# Patient Record
Sex: FEMALE | Race: WHITE | NOT HISPANIC OR LATINO | Employment: OTHER | URBAN - METROPOLITAN AREA
[De-identification: names, ages, dates, MRNs, and addresses within clinical notes are randomized per-mention and may not be internally consistent; named-entity substitution may affect disease eponyms.]

---

## 2021-12-20 ENCOUNTER — TELEPHONE (OUTPATIENT)
Dept: OPHTHALMOLOGY | Facility: CLINIC | Age: 81
End: 2021-12-20
Payer: COMMERCIAL

## 2021-12-20 NOTE — TELEPHONE ENCOUNTER
I spoke to the patient who notes gradually worsening vision.  She notes that she was disturbed when she went to pass her drivers test and wasn't able to see anything.  She has been dealing with other medical problems so she hasn't paid attention to her vision.  I scheduled her with Dr. Foster since she has seen him before.     Negative FIT test for colon cancer. Can you let pt know?

## 2022-01-10 ENCOUNTER — OFFICE VISIT (OUTPATIENT)
Dept: OPHTHALMOLOGY | Facility: CLINIC | Age: 82
End: 2022-01-10
Attending: OPHTHALMOLOGY
Payer: COMMERCIAL

## 2022-01-10 DIAGNOSIS — H53.40 VISUAL FIELD DEFECT: ICD-10-CM

## 2022-01-10 DIAGNOSIS — H47.20 OPTIC ATROPHY: Primary | ICD-10-CM

## 2022-01-10 DIAGNOSIS — H53.40 VISUAL FIELD DEFECT: Primary | ICD-10-CM

## 2022-01-10 PROCEDURE — 92083 EXTENDED VISUAL FIELD XM: CPT | Performed by: OPHTHALMOLOGY

## 2022-01-10 PROCEDURE — 92004 COMPRE OPH EXAM NEW PT 1/>: CPT | Mod: GC | Performed by: OPHTHALMOLOGY

## 2022-01-10 PROCEDURE — 92133 CPTRZD OPH DX IMG PST SGM ON: CPT | Performed by: OPHTHALMOLOGY

## 2022-01-10 PROCEDURE — G0463 HOSPITAL OUTPT CLINIC VISIT: HCPCS | Performed by: TECHNICIAN/TECHNOLOGIST

## 2022-01-10 ASSESSMENT — VISUAL ACUITY
OS_SC: 20/250
OS_SC+: ECCE
METHOD: SNELLEN - LINEAR
OD_SC+: -2
OD_SC: 20/50

## 2022-01-10 ASSESSMENT — CONF VISUAL FIELD
METHOD: COUNTING FINGERS
OS_INFERIOR_TEMPORAL_RESTRICTION: 2
OS_SUPERIOR_TEMPORAL_RESTRICTION: 2
OD_NORMAL: 1
OS_INFERIOR_NASAL_RESTRICTION: 1
OS_SUPERIOR_NASAL_RESTRICTION: 2

## 2022-01-10 ASSESSMENT — SLIT LAMP EXAM - LIDS
COMMENTS: NORMAL
COMMENTS: NORMAL

## 2022-01-10 ASSESSMENT — REFRACTION_MANIFEST
OD_SPHERE: PLANO
OS_ADD: +2.75
OD_AXIS: 080
OS_SPHERE: BALANCE
OD_CYLINDER: +0.50
OD_ADD: +2.75

## 2022-01-10 ASSESSMENT — TONOMETRY
OS_IOP_MMHG: 16
OD_IOP_MMHG: 18
IOP_METHOD: ICARE

## 2022-01-10 ASSESSMENT — EXTERNAL EXAM - LEFT EYE: OS_EXAM: NORMAL

## 2022-01-10 ASSESSMENT — CUP TO DISC RATIO
OS_RATIO: 0.5
OD_RATIO: 0.5

## 2022-01-10 ASSESSMENT — EXTERNAL EXAM - RIGHT EYE: OD_EXAM: NORMAL

## 2022-01-10 NOTE — PROGRESS NOTES
"     Assessment & Plan     Sherita Reyes is a 81 year old female with the following diagnoses:   1. 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  ACE, CLIFFORD, folate, Artur's, B12, NMO, treponema, Lyme unremarkable.  MRI brain previously was reported as unremarkable. She was also evaluated at [facility] ophthalmology without any new findings.     She had an ocular migraine a few years ago but that has gone away. She went to have 's license renewed and did not pass in December 2021 she could not see the small line with either eye.  She is taking Preservision since 2008 but is not following anyone for macular degeneration. Difficulty reading up close at night and driving at night. Left eye she always sees a \"blob\". Right eye reports she sees better with higher contrast and things are not as sharp. Denies metamorphopsia.    Since her last visit she had renal failure 1/2013 but did not and is not requiring dialysis. Cholecystectomy Fall of 2015. Shingles left torso and back (not on the face) 2015. And history of stage 3 metastatic melanoma 2016 s/p resection and adjuvant oral chemotherapy and lymph node resection. One year she had dental work for several teeth cracking.    Patient has been going through a lot with 's recent illness and is under a lot of stress.     Independent historians:  Patient    Review of outside testing:  MRI brain 6/2/2011    IMPRESSION:   1. No radiographic evidence of acute intracranial abnormalities.   2. Mild atrophy.   3. Mild supratentorial white matter changes are nonspecific, but likely due to chronic small vessel ischemic change in a patient of this age.      MRA head and neck 6/2/2011  IMPRESSION:  Unremarkable MRA of the neck, as far as visualized.   IMPRESSION:  Grossly unremarkable MRA of the head.         My interpretation performed today of outside testing:    Review of outside clinical notes:  None    Past " medical history:  Stage 3 melanoma  Dysphagia  Hiatal hernia s/p repair        Medications:   Aspirin 81  Diphenhydramine daily   Loperamide prn  Furosemide   Losartan  Preservision AREDS       Family history / social history:  Father AMD      Exam:  Visual acuity 20/50-2 right eye 20/250 eccentric left eye.  Color vision 5/11 right eye and 0/11 left eye.  Pupils   Intraocular pressure 18 right eye and 16 left eye.  Anterior segment exam posterior capsular opacification left eye.  Fundus exam notable for large soft drusen in both eyes greater in left than right eye.      Tests ordered and interpreted today:  OCT RNFL right eye 60 mean thickness (78 2011), left eye 48  mean thickness (was 59 in 2011).    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  Lab workup in 2011 including ACE, CLIFFORD, folate, Artur's, B12, NMO, treponema, Lyme unremarkable.  MRI also previously unremarkable.  The patient also went to [facility] and they were unable to find a cause.  Patient does not meet criteria for driving without restrictions from a vision standpoint in the [facility].  An updated glasses prescription will get the patient seeing enough to meet requirements but still does not have enough peripheral vision.  Recommend patient get updated glasses prescription and return for tech visit with repeat visual field.      Discussed the importance of re-establishing care in the retina clinic for AMD follow up, she used to see Dr. Vasquez but has not been evaluated in retina for many years.           Attending Physician Attestation:  Complete documentation of historical and exam elements from today's encounter can be found in the full encounter summary report (not reduplicated in this progress  note).  I personally obtained the chief complaint(s) and history of present illness.  I confirmed and edited as necessary the review of systems, past medical/surgical history, family history, social history, and examination findings as documented by others; and I examined the patient myself.  I personally reviewed the relevant tests, images, and reports as documented above.  I formulated and edited as necessary the assessment and plan and discussed the findings and management plan with the patient and family. I personally reviewed the ophthalmic test(s) associated with this encounter, agree with the interpretation(s) as documented by the resident/fellow, and have edited the corresponding report(s) as necessary.  - Prieto Kruger MD  Ophthalmology Resident, PGY-3  [facility]

## 2022-02-03 ENCOUNTER — ALLIED HEALTH/NURSE VISIT (OUTPATIENT)
Dept: OPHTHALMOLOGY | Facility: CLINIC | Age: 82
End: 2022-02-03
Attending: OPHTHALMOLOGY
Payer: COMMERCIAL

## 2022-02-03 DIAGNOSIS — H53.40 VISUAL FIELD DEFECT: Primary | ICD-10-CM

## 2022-02-03 PROBLEM — D50.9 IRON DEFICIENCY ANEMIA: Status: ACTIVE | Noted: 2017-03-24

## 2022-02-03 PROBLEM — Z86.0100 HISTORY OF COLONIC POLYPS: Status: ACTIVE | Noted: 2018-09-05

## 2022-02-03 PROBLEM — K63.5 POLYP OF CECUM: Status: ACTIVE | Noted: 2018-04-17

## 2022-02-03 PROBLEM — K22.2 OBSTRUCTION OF ESOPHAGUS: Status: ACTIVE | Noted: 2018-11-19

## 2022-02-03 PROBLEM — R68.81 EARLY SATIETY: Status: ACTIVE | Noted: 2022-02-03

## 2022-02-03 PROBLEM — D12.1 BENIGN NEOPLASM OF APPENDIX: Status: ACTIVE | Noted: 2017-03-24

## 2022-02-03 PROBLEM — K44.9 DIAPHRAGMATIC HERNIA: Status: ACTIVE | Noted: 2018-11-19

## 2022-02-03 PROBLEM — K64.9 HEMORRHOIDS: Status: ACTIVE | Noted: 2017-03-22

## 2022-02-03 PROCEDURE — 92081 LIMITED VISUAL FIELD XM: CPT | Mod: 26 | Performed by: OPHTHALMOLOGY

## 2022-02-03 PROCEDURE — 92081 LIMITED VISUAL FIELD XM: CPT

## 2022-02-03 PROCEDURE — G0463 HOSPITAL OUTPT CLINIC VISIT: HCPCS

## 2022-02-03 ASSESSMENT — VISUAL ACUITY
OD_SC: 20/60
METHOD: SNELLEN - LINEAR
OD_CC+: -1+1
CORRECTION_TYPE: GLASSES
OD_CC: 20/40
OS_SC: 20/600
OS_CC: 20/600

## 2022-02-03 ASSESSMENT — TONOMETRY
OD_IOP_MMHG: 18
OS_IOP_MMHG: 16
IOP_METHOD: ICARE

## 2022-02-03 ASSESSMENT — CONF VISUAL FIELD
OS_SUPERIOR_TEMPORAL_RESTRICTION: 2
OS_SUPERIOR_NASAL_RESTRICTION: 2
OS_INFERIOR_TEMPORAL_RESTRICTION: 2
OS_INFERIOR_NASAL_RESTRICTION: 1

## 2022-02-03 ASSESSMENT — REFRACTION_WEARINGRX
OD_SPHERE: PLANO
OD_AXIS: 075
OS_ADD: +2.75
OD_CYLINDER: +0.50
OD_ADD: +2.75
OS_SPHERE: -0.50
OS_AXIS: 100
OS_CYLINDER: +1.00

## 2022-02-03 NOTE — NURSING NOTE
Chief Complaints and History of Present Illnesses   Patient presents with     Follow Up     Chief Complaint(s) and History of Present Illness(es)     Follow Up     Laterality: both eyes    Quality: States the new MRx is okay    Pain scale: 0/10              Comments     Here for repeat of DVF  Leona NAILS 10:21 AM February 3, 2022

## 2022-02-03 NOTE — PROGRESS NOTES
Assessment & Plan     Sherita Reyes is a 81 year old female with the following diagnoses:   1. Visual field defect         The patient came for a visual field only.  Please see separate interpretation.           Attending Physician Attestation:  I have seen and examined this patient.  I have confirmed and edited as necessary the chief complaint(s), history of present illness, review of systems, relevant history, and examination findings as documented by others.  I have personally reviewed the relevant tests, images, and reports as documented above.  I have confirmed and edited as necessary the assessment and plan and agree with this note.  - Prieto Foster MD 12:49 PM 2/3/2022

## 2022-02-07 ENCOUNTER — TELEPHONE (OUTPATIENT)
Dept: OPHTHALMOLOGY | Facility: CLINIC | Age: 82
End: 2022-02-07
Payer: COMMERCIAL

## 2022-02-07 NOTE — TELEPHONE ENCOUNTER
Spoke to patient.  With updated glasses she sees better and did much better on the visual field last visit.  This gives her enough peripheral vision to visual acuity to drive in the [facility] from a vision standpoint.  Form filled out and faxed.  Mailed copy to patient's address.      Ethel Osborne on 2/7/2022 at 10:05 AM

## 2022-02-16 ENCOUNTER — TELEPHONE (OUTPATIENT)
Dept: OPHTHALMOLOGY | Facility: CLINIC | Age: 82
End: 2022-02-16
Payer: COMMERCIAL

## 2022-02-16 NOTE — TELEPHONE ENCOUNTER
[facility] Call Center    Phone Message    May a detailed message be left on voicemail: yes     Reason for Call: Other: pt said needs to talk to Jaqueline about DMV Forms that need to be filled out something missing on last form, please call pt back to discuss    Thank you,    Action Taken: Message routed to:  [facility] ([facility] eye    Travel Screening: Not Applicable

## 2022-02-17 NOTE — TELEPHONE ENCOUNTER
Spoke to patient.  She received a letter saying the provider needs to provide more information.  She will fax me the letter and I will give her a call once I review.      Ethel Osborne on 2/17/2022 at 10:41 AM

## 2022-02-22 DIAGNOSIS — H35.30 AMD (AGE-RELATED MACULAR DEGENERATION), BILATERAL: Primary | ICD-10-CM

## 2022-02-23 ENCOUNTER — OFFICE VISIT (OUTPATIENT)
Dept: OPHTHALMOLOGY | Facility: CLINIC | Age: 82
End: 2022-02-23
Attending: OPHTHALMOLOGY
Payer: COMMERCIAL

## 2022-02-23 DIAGNOSIS — H35.30 AMD (AGE-RELATED MACULAR DEGENERATION), BILATERAL: ICD-10-CM

## 2022-02-23 DIAGNOSIS — H26.492 PCO (POSTERIOR CAPSULAR OPACIFICATION), LEFT: Primary | ICD-10-CM

## 2022-02-23 DIAGNOSIS — Z96.1 PSEUDOPHAKIA OF BOTH EYES: ICD-10-CM

## 2022-02-23 DIAGNOSIS — H35.713: ICD-10-CM

## 2022-02-23 DIAGNOSIS — H47.20 OPTIC ATROPHY: ICD-10-CM

## 2022-02-23 PROCEDURE — 92014 COMPRE OPH EXAM EST PT 1/>: CPT | Mod: 57 | Performed by: OPHTHALMOLOGY

## 2022-02-23 PROCEDURE — 92134 CPTRZ OPH DX IMG PST SGM RTA: CPT | Performed by: OPHTHALMOLOGY

## 2022-02-23 PROCEDURE — 250N000009 HC RX 250: Performed by: OPHTHALMOLOGY

## 2022-02-23 PROCEDURE — 66821 AFTER CATARACT LASER SURGERY: CPT | Mod: LT | Performed by: OPHTHALMOLOGY

## 2022-02-23 PROCEDURE — G0463 HOSPITAL OUTPT CLINIC VISIT: HCPCS

## 2022-02-23 PROCEDURE — 92250 FUNDUS PHOTOGRAPHY W/I&R: CPT | Performed by: OPHTHALMOLOGY

## 2022-02-23 RX ORDER — DIPHENHYDRAMINE HCL 25 MG
25 TABLET ORAL DAILY
COMMUNITY

## 2022-02-23 RX ORDER — FUROSEMIDE 20 MG
20 TABLET ORAL DAILY
COMMUNITY
Start: 2021-07-15

## 2022-02-23 RX ORDER — ACETAMINOPHEN 160 MG
TABLET,DISINTEGRATING ORAL DAILY
COMMUNITY

## 2022-02-23 RX ORDER — LOSARTAN POTASSIUM 25 MG/1
1 TABLET ORAL DAILY
COMMUNITY
Start: 2021-10-05

## 2022-02-23 RX ADMIN — APRACLONIDINE HYDROCHLORIDE 1 DROP: 10 SOLUTION/ DROPS OPHTHALMIC at 14:30

## 2022-02-23 ASSESSMENT — TONOMETRY
OD_IOP_MMHG: 15
IOP_METHOD: TONOPEN
OS_IOP_MMHG: 14

## 2022-02-23 ASSESSMENT — REFRACTION_WEARINGRX
OS_AXIS: 100
OS_SPHERE: -0.50
OS_CYLINDER: +1.00
OS_ADD: +2.75
OD_AXIS: 075
OD_SPHERE: PLANO
OD_ADD: +2.75
OD_CYLINDER: +0.50

## 2022-02-23 ASSESSMENT — VISUAL ACUITY
METHOD: SNELLEN - LINEAR
OS_CC: 20/600
OD_CC: 20/50
OD_CC+: -2

## 2022-02-23 ASSESSMENT — CONF VISUAL FIELD
OS_INFERIOR_NASAL_RESTRICTION: 1
METHOD: COUNTING FINGERS
OD_NORMAL: 1

## 2022-02-23 ASSESSMENT — CUP TO DISC RATIO
OS_RATIO: 0.5
OD_RATIO: 0.5

## 2022-02-23 ASSESSMENT — SLIT LAMP EXAM - LIDS
COMMENTS: NORMAL
COMMENTS: NORMAL

## 2022-02-23 ASSESSMENT — EXTERNAL EXAM - LEFT EYE: OS_EXAM: NORMAL

## 2022-02-23 ASSESSMENT — EXTERNAL EXAM - RIGHT EYE: OD_EXAM: NORMAL

## 2022-02-23 NOTE — PROGRESS NOTES
"Jaqueline    CC - retinal evaluation    INTERVAL HISTORY - Initial visit    HPI -   Sherita Reyes is a  81 year old year-old patient, who follows Dr Foster for optic atrophy of unknown etiology, presents today to establish care for possible macular degeneration. Used to see Dr Vasquez many years ago. Is taking preservision since 2008. Vision has been stable last few years, with poorer vision in left eye.      Hx of renal failure 1/2013 but did not and is not requiring dialysis. Cholecystectomy Fall of 2015. Shingles left torso and back (not on the face) 2015. And history of stage 3 metastatic melanoma 2016 s/p resection and adjuvant oral chemotherapy and lymph node resection.    From Dr. 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  Lab workup in 2011 including ACE, CLIFFORD, folate, Artur's, B12, NMO, treponema, Lyme unremarkable.  MRI also previously unremarkable.  The patient also went to [facility] and they were unable to find a cause.  Patient does not meet criteria for driving without restrictions from a vision standpoint in the [facility].  An updated glasses prescription will get the patient seeing enough to meet requirements but still does not have enough peripheral vision.  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 Same lesion (smaller and less in number) seen " in 2011 OCTs  - used to use steroid injection to knee, hip in the past; not on steroid now  - underwent chemotherapy for advanced skin melanoma ~4-5 yrs ago but has been clear for ~5 yrs    - right eye fovea not involved; left eye slight hyper reflective material deposited in subfovea; DDx multifocal adult vitelliform disease vs choronic CSCR (absence of pachychoroid is against the diagnosis of CSCR)  - subfoveal hyperreflective material deposit may contribute to decreased vision (left eye > right eye as left eye has RPE atrophy too)  - not likely a macular degeneration (no drusen; choronic subretinal fluid and subfoveal hyperreflective deposits)  - observe    # ERM each eye  - mild: observe    #Optic atrophy ou  - long standing; extensive work up but unknown etiology  - following Dr Foster      return to clinic: 6 mo, OCT, FAF    Complete documentation of historical and exam elements from today's encounter can be found in the full encounter summary report (not reduplicated in this progress note). I personally obtained the chief complaint(s) and history of present illness.  I confirmed and edited as necessary the review of systems, past medical/surgical history, family history, social history, and examination findings as documented by others; and I examined the patient myself. I personally reviewed the relevant tests, images, and reports as documented above. I formulated and edited as necessary the assessment and plan and discussed the findings and management plan with the patient and family.     J Luis Wheeler MD, PhD

## 2022-02-23 NOTE — LETTER
"2/23/2022       RE: Sherita Reyes  [id number] [id number]     Dear Colleague,    Thank you for referring your patient, Sherita Reyes, to the [facility]  at [facility]. Please see a copy of my visit note below.    Thank you for allowing me to participate in the care of your patient.      Sincerely,    MD Jaqueline Aggarwal    CC - retinal evaluation    INTERVAL HISTORY - Initial visit    HPI -   Sherita Reyes is a  81 year old year-old patient, who follows Dr Foster for optic atrophy of unknown etiology, presents today to establish care for possible macular degeneration. Used to see Dr Vasquez many years ago. Is taking preservision since 2008. Vision has been stable last few years, with poorer vision in left eye.      Hx of renal failure 1/2013 but did not and is not requiring dialysis. Cholecystectomy Fall of 2015. Shingles left torso and back (not on the face) 2015. And history of stage 3 metastatic melanoma 2016 s/p resection and adjuvant oral chemotherapy and lymph node resection.    From Dr. 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  Lab workup in 2011 including ACE, CLIFFORD, folate, Artur's, B12, NMO, treponema, Lyme unremarkable.  MRI also previously unremarkable.  The patient also went to [facility] and they were unable to find a cause.  Patient does not meet criteria for driving without restrictions from a vision standpoint in the State [facility].  An updated glasses prescription will get the patient seeing enough to meet requirements but still does not have enough peripheral vision.  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 Same lesion (smaller and less in number) seen in 2011 OCTs  - used to use steroid injection to knee, hip in the past; not on steroid now  - underwent chemotherapy for advanced skin melanoma ~4-5 yrs ago but has been clear for ~5 yrs    - right eye fovea not involved; left eye slight hyper reflective material deposited in subfovea; DDx multifocal adult vitelliform disease vs choronic CSCR (absence of pachychoroid is against the diagnosis of CSCR)  - subfoveal hyperreflective material deposit may contribute to decreased vision (left eye > right eye as left eye has RPE atrophy too)  - not likely a macular degeneration (no drusen; choronic subretinal fluid and subfoveal hyperreflective deposits)  - observe    # ERM each eye  - mild: observe    #Optic atrophy ou  - long standing; extensive work up but unknown etiology  - following Dr Foster      return to clinic: 6 mo, OCT, FAF    Complete documentation of historical and exam elements from today's encounter can be found in the full encounter summary report (not reduplicated in this progress note). I personally obtained the chief complaint(s) and history of present illness.  I confirmed and edited as necessary the review of systems, past medical/surgical history, family history, social history, and examination findings as documented by others; and I examined the patient myself. I personally reviewed the relevant tests, images, and reports as documented above. I formulated and edited as necessary the assessment and plan and discussed the findings and management plan with the patient and family.      J Luis Wheeler MD, PhD

## 2022-02-23 NOTE — NURSING NOTE
Chief Complaints and History of Present Illnesses   Patient presents with     Macular Degeneration Evaluation     Chief Complaint(s) and History of Present Illness(es)     Macular Degeneration Evaluation     Laterality: both eyes    Associated symptoms: Negative for flashes, floaters and dryness    Treatments tried: no treatments    Pain scale: 0/10              Comments     New macular degeneration evaluation to establish care.   The patient would like more information on macular degeneration.   Carlotta Restrepo, COA, COA 12:39 PM 02/23/2022

## 2022-06-27 ENCOUNTER — TELEPHONE (OUTPATIENT)
Dept: OPHTHALMOLOGY | Facility: CLINIC | Age: 82
End: 2022-06-27

## 2022-08-03 DIAGNOSIS — H35.30 AMD (AGE-RELATED MACULAR DEGENERATION), BILATERAL: Primary | ICD-10-CM

## 2022-10-05 ENCOUNTER — OFFICE VISIT (OUTPATIENT)
Dept: OPHTHALMOLOGY | Facility: CLINIC | Age: 82
End: 2022-10-05
Attending: OPHTHALMOLOGY
Payer: COMMERCIAL

## 2022-10-05 DIAGNOSIS — H40.1230 LOW-TENSION GLAUCOMA OF BOTH EYES, UNSPECIFIED GLAUCOMA STAGE: Primary | ICD-10-CM

## 2022-10-05 DIAGNOSIS — H47.20 OPTIC ATROPHY: ICD-10-CM

## 2022-10-05 DIAGNOSIS — H26.492 PCO (POSTERIOR CAPSULAR OPACIFICATION), LEFT: ICD-10-CM

## 2022-10-05 DIAGNOSIS — H35.30 AMD (AGE-RELATED MACULAR DEGENERATION), BILATERAL: ICD-10-CM

## 2022-10-05 DIAGNOSIS — Z96.1 PSEUDOPHAKIA OF BOTH EYES: ICD-10-CM

## 2022-10-05 DIAGNOSIS — H35.713: ICD-10-CM

## 2022-10-05 PROCEDURE — 92134 CPTRZ OPH DX IMG PST SGM RTA: CPT | Performed by: OPHTHALMOLOGY

## 2022-10-05 PROCEDURE — 92250 FUNDUS PHOTOGRAPHY W/I&R: CPT | Mod: 59 | Performed by: OPHTHALMOLOGY

## 2022-10-05 PROCEDURE — G0463 HOSPITAL OUTPT CLINIC VISIT: HCPCS | Mod: 25

## 2022-10-05 PROCEDURE — 99214 OFFICE O/P EST MOD 30 MIN: CPT | Performed by: OPHTHALMOLOGY

## 2022-10-05 RX ORDER — LATANOPROST 50 UG/ML
1 SOLUTION/ DROPS OPHTHALMIC AT BEDTIME
Qty: 2.5 ML | Refills: 3 | Status: SHIPPED | OUTPATIENT
Start: 2022-10-05

## 2022-10-05 ASSESSMENT — SLIT LAMP EXAM - LIDS
COMMENTS: NORMAL
COMMENTS: NORMAL

## 2022-10-05 ASSESSMENT — REFRACTION_WEARINGRX
OS_AXIS: 100
OD_CYLINDER: +0.50
OS_ADD: +2.75
OD_ADD: +2.75
OD_SPHERE: PLANO
OD_AXIS: 075
OS_SPHERE: -0.50
OS_CYLINDER: +1.00

## 2022-10-05 ASSESSMENT — VISUAL ACUITY
METHOD: SNELLEN - LINEAR
CORRECTION_TYPE: GLASSES
OS_PH_CC: 20/600
OD_CC: 20/50
OS_CC: CF @ 3 FT

## 2022-10-05 ASSESSMENT — TONOMETRY
OD_IOP_MMHG: 14
OS_IOP_MMHG: 13
IOP_METHOD: TONOPEN

## 2022-10-05 ASSESSMENT — PACHYMETRY
OS_CT(UM): 551
OD_CT(UM): 554

## 2022-10-05 ASSESSMENT — CONF VISUAL FIELD
OS_INFERIOR_NASAL_RESTRICTION: 1
METHOD: COUNTING FINGERS

## 2022-10-05 ASSESSMENT — EXTERNAL EXAM - LEFT EYE: OS_EXAM: NORMAL

## 2022-10-05 ASSESSMENT — CUP TO DISC RATIO
OD_RATIO: 0.8
OS_RATIO: 0.8

## 2022-10-05 ASSESSMENT — EXTERNAL EXAM - RIGHT EYE: OD_EXAM: NORMAL

## 2022-10-05 NOTE — PROGRESS NOTES
"Jaqueline    CC - retinal evaluation    INTERVAL HISTORY - occasionally seeing colored floaters (green, pink, red)    HPI -   Sherita Reyes is a  81 year old year-old patient, who follows Dr Foster for optic atrophy of unknown etiology, presents today to establish care for possible macular degeneration. Used to see Dr Vasquez many years ago. Is taking preservision since 2008. Vision has been stable last few years, with poorer vision in left eye.      Hx of renal failure 1/2013 but did not and is not requiring dialysis. Cholecystectomy Fall of 2015. Shingles left torso and back (not on the face) 2015. And history of stage 3 metastatic melanoma 2016 s/p resection and adjuvant oral chemotherapy and lymph node resection.    From Dr. 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  Lab workup in 2011 including ACE, CLIFFORD, folate, Artur's, B12, NMO, treponema, Lyme unremarkable.  MRI also previously unremarkable.  The patient also went to [facility] and they were unable to find a cause.  Patient does not meet criteria for driving without restrictions from a vision standpoint in the [facility].  An updated glasses prescription will get the patient seeing enough to meet requirements but still does not have enough peripheral vision.  Recommend patient get updated glasses prescription and return for tech visit with repeat visual field.\"      PAST OCULAR SURGERY  CE IOL each eye  2008    RETINAL IMAGING:  OCT 2/23/2022   OD - foci of SRF fairly stable compared to 2017; pachychoroid (too thick for her age)  OS - foci of SRF; subfoveal hyperreflective material deposit at fovea with underlying RPE atrophy; extrafoveal subretinal hyperreflective material deposit; pachychoroid (choroid thick for her age)    FAF 2/23/22  each eye irregular multifocal hyper and hypoAF patches      ASSESSMENT & PLAN    # subretinal fluid ou  # subretinal deposits ou   - " 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 Same lesion (smaller and less in number) seen in 2011 OCTs  - used to use steroid injection to knee, hip in the past; not on steroid now  - underwent chemotherapy for advanced skin melanoma ~4-5 yrs ago but has been clear for ~5 yrs    - right eye fovea not involved; left eye slight hyper reflective material deposited in subfovea; DDx multifocal adult vitelliform disease vs choronic CSCR with subretinal deposts (pachychoroid present that is for CSCR)  - subfoveal hyperreflective material deposit may contribute to decreased vision (left eye > right eye as left eye has RPE atrophy too)  - not likely a macular degeneration (no drusen; choronic subretinal fluid and subfoveal hyperreflective deposits)  - observe    # ERM each eye  - mild: observe    #Optic atrophy ou  - long standing; extensive work up but unknown etiology  - following Dr Foster      return to clinic: 6 mo, OCT, FAF    Complete documentation of historical and exam elements from today's encounter can be found in the full encounter summary report (not reduplicated in this progress note). I personally obtained the chief complaint(s) and history of present illness.  I confirmed and edited as necessary the review of systems, past medical/surgical history, family history, social history, and examination findings as documented by others; and I examined the patient myself. I personally reviewed the relevant tests, images, and reports as documented above. I formulated and edited as necessary the assessment and plan and discussed the findings and management plan with the patient and family.     J Luis Wheeler MD, PhD

## 2022-10-05 NOTE — NURSING NOTE
"Chief Complaints and History of Present Illnesses   Patient presents with     Follow Up     Chief Complaint(s) and History of Present Illness(es)     Follow Up               Comments     Patient states she ran out of preservision and it took a couple days to get more. She states that while she was not taking it, she noticed green \"shells\" in her vision. Noticed them later in the day when she was tired. She has not noticed it since she restarted it. Patient states that her vision was blurry with the green things, but otherwise no issues. No pain and irritation. No flashes of light. No floaters.     Ocular Meds:none     Eduardo NAILS, October 5, 2022 9:54 AM                      "

## 2023-01-24 DIAGNOSIS — H35.30 AMD (AGE-RELATED MACULAR DEGENERATION), BILATERAL: Primary | ICD-10-CM

## 2023-02-07 ENCOUNTER — OFFICE VISIT (OUTPATIENT)
Dept: OPHTHALMOLOGY | Facility: CLINIC | Age: 83
End: 2023-02-07
Attending: OPHTHALMOLOGY
Payer: COMMERCIAL

## 2023-02-07 DIAGNOSIS — H53.40 VISUAL FIELD DEFECT: ICD-10-CM

## 2023-02-07 DIAGNOSIS — Z96.1 PSEUDOPHAKIA OF BOTH EYES: ICD-10-CM

## 2023-02-07 DIAGNOSIS — H35.30 AMD (AGE-RELATED MACULAR DEGENERATION), BILATERAL: Primary | ICD-10-CM

## 2023-02-07 DIAGNOSIS — H40.1230 LOW-TENSION GLAUCOMA OF BOTH EYES, UNSPECIFIED GLAUCOMA STAGE: ICD-10-CM

## 2023-02-07 DIAGNOSIS — H35.713: ICD-10-CM

## 2023-02-07 DIAGNOSIS — H47.20 OPTIC ATROPHY: ICD-10-CM

## 2023-02-07 PROCEDURE — G0463 HOSPITAL OUTPT CLINIC VISIT: HCPCS | Mod: 25

## 2023-02-07 PROCEDURE — 92081 LIMITED VISUAL FIELD XM: CPT | Performed by: OPHTHALMOLOGY

## 2023-02-07 PROCEDURE — 92134 CPTRZ OPH DX IMG PST SGM RTA: CPT | Performed by: OPHTHALMOLOGY

## 2023-02-07 PROCEDURE — 99207 FUNDUS AUTOFLUORESCENCE IMAGE (FAF) OU (BOTH EYES): CPT | Mod: 26 | Performed by: OPHTHALMOLOGY

## 2023-02-07 PROCEDURE — 92250 FUNDUS PHOTOGRAPHY W/I&R: CPT | Performed by: OPHTHALMOLOGY

## 2023-02-07 PROCEDURE — 99214 OFFICE O/P EST MOD 30 MIN: CPT | Performed by: OPHTHALMOLOGY

## 2023-02-07 ASSESSMENT — REFRACTION_WEARINGRX
OD_CYLINDER: +0.50
OS_ADD: +2.75
OD_SPHERE: PLANO
OS_AXIS: 100
OD_AXIS: 075
OS_SPHERE: -0.50
OS_CYLINDER: +1.00
OD_ADD: +2.75

## 2023-02-07 ASSESSMENT — REFRACTION_MANIFEST
OD_SPHERE: -0.25
OS_SPHERE: BALANCE
OD_AXIS: 067
OD_CYLINDER: +0.50

## 2023-02-07 ASSESSMENT — VISUAL ACUITY
OD_CC: 20/50
METHOD: SNELLEN - LINEAR
OS_CC: CF @ 3'
CORRECTION_TYPE: GLASSES
OD_CC+: -2

## 2023-02-07 ASSESSMENT — SLIT LAMP EXAM - LIDS
COMMENTS: NORMAL
COMMENTS: NORMAL

## 2023-02-07 ASSESSMENT — TONOMETRY
OD_IOP_MMHG: 25
IOP_METHOD: TONOPEN
OS_IOP_MMHG: 22

## 2023-02-07 ASSESSMENT — EXTERNAL EXAM - RIGHT EYE: OD_EXAM: NORMAL

## 2023-02-07 ASSESSMENT — ENCOUNTER SYMPTOMS: JOINT SWELLING: 1

## 2023-02-07 ASSESSMENT — CUP TO DISC RATIO
OD_RATIO: 0.8
OS_RATIO: 0.8

## 2023-02-07 ASSESSMENT — CONF VISUAL FIELD: OS_INFERIOR_TEMPORAL_RESTRICTION: 3

## 2023-02-07 ASSESSMENT — EXTERNAL EXAM - LEFT EYE: OS_EXAM: NORMAL

## 2023-02-07 NOTE — PROGRESS NOTES
"Jaqueline    CC - retinal evaluation    INTERVAL HISTORY - vision is stable in both eyes.   No new episodes of colored floaters    HPI -   Sherita Reyes is a 82 year old year-old patient, who follows Dr Foster for optic atrophy of unknown etiology, presents today to establish care for possible macular degeneration. Used to see Dr Vasquez many years ago. Is taking preservision since 2008. Vision has been stable last few years, with poorer vision in left eye.      Hx of renal failure 1/2013 but did not and is not requiring dialysis. Cholecystectomy Fall of 2015. Shingles left torso and back (not on the face) 2015. And history of stage 3 metastatic melanoma 2016 s/p resection and adjuvant oral chemotherapy and lymph node resection.    From Dr. 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  Lab workup in 2011 including ACE, CLIFFORD, folate, Artur's, B12, NMO, treponema, Lyme unremarkable.  MRI also previously unremarkable.  The patient also went to [facility] and they were unable to find a cause.  Patient does not meet criteria for driving without restrictions from a vision standpoint in the [facility].  An updated glasses prescription will get the patient seeing enough to meet requirements but still does not have enough peripheral vision.  Recommend patient get updated glasses prescription and return for tech visit with repeat visual field.\"      PAST OCULAR SURGERY  CE IOL each eye  2008    RETINAL IMAGING:  OCT 2/7/23   OD - foci of SRF fairly stable compared to 2017; pachychoroid (too thick for her age)  OS - foci of SRF; subfoveal hyperreflective material deposit at fovea with underlying RPE atrophy; extrafoveal subretinal hyperreflective material deposit; pachychoroid (choroid thick for her age)    FAF 2/23/22  each eye irregular multifocal hyper and hypoAF patches      ASSESSMENT & PLAN    # subretinal fluid ou  # subretinal deposits " ou   - 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 Same lesion (smaller and less in number) seen in 2011 OCTs  - used to use steroid injection to knee, hip in the past; not on steroid now  - underwent chemotherapy for advanced skin melanoma ~4-5 yrs ago but has been clear for ~5 yrs    - right eye fovea not involved; left eye slight hyper reflective material deposited in subfovea; DDx multifocal adult vitelliform disease vs choronic CSCR with subretinal deposts (pachychoroid present that is for CSCR)  - subfoveal hyperreflective material deposit may contribute to decreased vision (left eye > right eye as left eye has RPE atrophy too)  - not likely a macular degeneration (no drusen; choronic subretinal fluid and subfoveal hyperreflective deposits)  - observe    # ERM each eye  - mild: observe    #Optic atrophy ou  - long standing; extensive work up but unknown etiology  - following Dr Foster      return to clinic: 12 mo, OCT, FAF and DFE OU    Complete documentation of historical and exam elements from today's encounter can be found in the full encounter summary report (not reduplicated in this progress note). I personally obtained the chief complaint(s) and history of present illness.  I confirmed and edited as necessary the review of systems, past medical/surgical history, family history, social history, and examination findings as documented by others; and I examined the patient myself. I personally reviewed the relevant tests, images, and reports as documented above. I formulated and edited as necessary the assessment and plan and discussed the findings and management plan with the patient and family.     J Luis Wheeler MD, PhD
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
## 2024-07-29 DIAGNOSIS — H35.30 AMD (AGE-RELATED MACULAR DEGENERATION), BILATERAL: Primary | ICD-10-CM

## 2024-08-14 ENCOUNTER — OFFICE VISIT (OUTPATIENT)
Dept: OPHTHALMOLOGY | Facility: CLINIC | Age: 84
End: 2024-08-14
Attending: OPHTHALMOLOGY
Payer: COMMERCIAL

## 2024-08-14 ENCOUNTER — MEDICAL CORRESPONDENCE (OUTPATIENT)
Dept: HEALTH INFORMATION MANAGEMENT | Facility: CLINIC | Age: 84
End: 2024-08-14
Payer: COMMERCIAL

## 2024-08-14 DIAGNOSIS — H35.373 EPIRETINAL MEMBRANE (ERM) OF BOTH EYES: ICD-10-CM

## 2024-08-14 DIAGNOSIS — H35.713 CENTRAL SEROUS CHORIORETINOPATHY OF BOTH EYES: Primary | ICD-10-CM

## 2024-08-14 DIAGNOSIS — H35.54 MACULAR PATTERN DYSTROPHY: ICD-10-CM

## 2024-08-14 DIAGNOSIS — H47.20 PARTIAL OPTIC ATROPHY: ICD-10-CM

## 2024-08-14 DIAGNOSIS — H40.051 BORDERLINE GLAUCOMA OF RIGHT EYE WITH OCULAR HYPERTENSION: ICD-10-CM

## 2024-08-14 DIAGNOSIS — H04.123 DRY EYE SYNDROME OF BOTH EYES: ICD-10-CM

## 2024-08-14 DIAGNOSIS — Z96.1 PSEUDOPHAKIA OF BOTH EYES: ICD-10-CM

## 2024-08-14 PROCEDURE — G0463 HOSPITAL OUTPT CLINIC VISIT: HCPCS | Performed by: OPHTHALMOLOGY

## 2024-08-14 PROCEDURE — 99207 FUNDUS AUTOFLUORESCENCE IMAGE (FAF) OU (BOTH EYES): CPT | Mod: 26 | Performed by: OPHTHALMOLOGY

## 2024-08-14 PROCEDURE — 92081 LIMITED VISUAL FIELD XM: CPT | Performed by: OPHTHALMOLOGY

## 2024-08-14 PROCEDURE — 92015 DETERMINE REFRACTIVE STATE: CPT

## 2024-08-14 PROCEDURE — 92250 FUNDUS PHOTOGRAPHY W/I&R: CPT | Performed by: OPHTHALMOLOGY

## 2024-08-14 PROCEDURE — 99214 OFFICE O/P EST MOD 30 MIN: CPT | Mod: GC | Performed by: OPHTHALMOLOGY

## 2024-08-14 PROCEDURE — 92250 FUNDUS PHOTOGRAPHY W/I&R: CPT | Mod: 59 | Performed by: OPHTHALMOLOGY

## 2024-08-14 PROCEDURE — 92134 CPTRZ OPH DX IMG PST SGM RTA: CPT | Performed by: OPHTHALMOLOGY

## 2024-08-14 RX ORDER — LORATADINE 10 MG/1
10 TABLET ORAL DAILY
COMMUNITY

## 2024-08-14 RX ORDER — ONDANSETRON 4 MG/1
4 TABLET, ORALLY DISINTEGRATING ORAL EVERY 8 HOURS PRN
COMMUNITY
Start: 2023-03-28

## 2024-08-14 RX ORDER — BETHANECHOL CHLORIDE 5 MG
5 TABLET ORAL 3 TIMES DAILY
COMMUNITY

## 2024-08-14 ASSESSMENT — CONF VISUAL FIELD
OD_INFERIOR_NASAL_RESTRICTION: 0
OS_INFERIOR_NASAL_RESTRICTION: 3
OD_NORMAL: 1
OD_SUPERIOR_TEMPORAL_RESTRICTION: 0
OD_SUPERIOR_NASAL_RESTRICTION: 0
OD_INFERIOR_TEMPORAL_RESTRICTION: 0
METHOD: COUNTING FINGERS

## 2024-08-14 ASSESSMENT — TONOMETRY
IOP_METHOD: ICARE
OS_IOP_MMHG: 22
OD_IOP_MMHG: 28

## 2024-08-14 ASSESSMENT — REFRACTION_MANIFEST
OD_CYLINDER: +0.50
OS_CYLINDER: +1.25
OS_ADD: +3.25
OD_ADD: +3.25
OS_AXIS: 095
OD_SPHERE: +0.25
OS_SPHERE: -1.50
OD_AXIS: 065

## 2024-08-14 ASSESSMENT — VISUAL ACUITY
METHOD: SNELLEN - LINEAR
CORRECTION_TYPE: GLASSES
OD_CC+: -1
OD_CC: 20/80

## 2024-08-14 ASSESSMENT — REFRACTION_WEARINGRX
OS_CYLINDER: +1.00
OS_AXIS: 100
OS_SPHERE: -0.50
OD_SPHERE: PLANO
OS_ADD: +2.75
OD_AXIS: 075
OD_ADD: +2.75
OD_CYLINDER: +0.50

## 2024-08-14 ASSESSMENT — PACHYMETRY
OD_CT(UM): 554
OS_CT(UM): 551

## 2024-08-14 NOTE — NURSING NOTE
"Chief Complaints and History of Present Illnesses   Patient presents with    Follow Up     Follow up for Subretinal fluid each eye, Subretinal deposits each eye, ERM each eye, and Optic atrophy each eye.     Patient notes her vision is more blurry each eye over the last year. \"My glasses broke. I still need to see to drive. I drive my son around for his appointments. I don't drive at night. Should I get tinted lenses?\" Patient notes she is very sensitive to bright lights.  Patient notes she hasn't seen any colorful (green and pink) floaters in the last month. \"I drink more water now.\"        Chief Complaint(s) and History of Present Illness(es)       Follow Up              Laterality: both eyes    Onset: years ago    Quality: blurred vision    Course: gradually worsening    Associated symptoms: glare and photophobia.  Negative for dryness, eye pain, flashes and floaters    Treatments tried: glasses    Pain scale: 0/10    Comments: Follow up for Subretinal fluid each eye, Subretinal deposits each eye, ERM each eye, and Optic atrophy each eye.     Patient notes her vision is more blurry each eye over the last year. \"My glasses broke. I still need to see to drive. I drive my son around for his appointments. I don't drive at night. Should I get tinted lenses?\" Patient notes she is very sensitive to bright lights.  Patient notes she hasn't seen any colorful (green and pink) floaters in the last month. \"I drink more water now.\"                 Comments    \"I can manage driving around short distances.\" Not currently using any eye drops.     She is under a lot of stress. Her  passed away on 11/27/23. 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     Ana Rosa Mast, COT 1:33 PM 08/14/2024                         "

## 2024-08-14 NOTE — PROGRESS NOTES
"Jaqueline    CC - retinal evaluation    INTERVAL HISTORY - vision is stable in both eyes.   No new episodes of colored floaters    HPI -   Sherita Reyes is a 82 year old year-old patient, who follows Dr Foster for optic atrophy of unknown etiology, presents today to establish care for possible macular degeneration. Used to see Dr Vasquez many years ago. Is taking preservision since 2008. Vision has been stable last few years, with poorer vision in left eye.      Hx of renal failure 1/2013 but did not and is not requiring dialysis. Cholecystectomy Fall of 2015. Shingles left torso and back (not on the face) 2015. And history of stage 3 metastatic melanoma 2016 s/p resection and adjuvant oral chemotherapy and lymph node resection.    From Dr. 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  Lab workup in 2011 including ACE, CLIFFORD, folate, Artur's, B12, NMO, treponema, Lyme unremarkable.  MRI also previously unremarkable.  The patient also went to [facility] and they were unable to find a cause.  Patient does not meet criteria for driving without restrictions from a vision standpoint in the [facility].  An updated glasses prescription will get the patient seeing enough to meet requirements but still does not have enough peripheral vision.  Recommend patient get updated glasses prescription and return for tech visit with repeat visual field.\"      PAST OCULAR SURGERY  CE IOL each eye  2008    RETINAL IMAGING:  OCT 2/7/23   OD - foci of SRF fairly stable compared to 2017; pachychoroid (too thick for her age)  OS - foci of SRF; subfoveal hyperreflective material deposit at fovea with underlying RPE atrophy; extrafoveal subretinal hyperreflective material deposit; pachychoroid (choroid thick for her age)    FAF 2/23/22  each eye irregular multifocal hyper and hypoAF patches      ASSESSMENT & PLAN    # subretinal fluid ou  # subretinal deposits " ou   - 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 Same lesion (smaller and less in number) seen in 2011 OCTs  - used to use steroid injection to knee, hip in the past; not on steroid now  - underwent chemotherapy for advanced skin melanoma ~4-5 yrs ago but has been clear for ~5 yrs    - right eye fovea not involved; left eye slight hyper reflective material deposited in subfovea; DDx multifocal adult vitelliform disease vs choronic CSCR with subretinal deposts (pachychoroid present) vs pattern dystrophy of macula  - subfoveal hyperreflective material deposit may contribute to decreased vision (left eye > right eye as left eye has RPE atrophy too)  - not likely a macular degeneration (no drusen; choronic subretinal fluid and subfoveal hyperreflective deposits)  - observe    # ERM each eye  - mild: observe    #Optic atrophy ou  - long standing; extensive work up but unknown etiology  - following Dr Foster    # dry eye ou  AT if needed      return to clinic: 12 mo, OCT, FAF and DFE OU    Complete documentation of historical and exam elements from today's encounter can be found in the full encounter summary report (not reduplicated in this progress note). I personally obtained the chief complaint(s) and history of present illness.  I confirmed and edited as necessary the review of systems, past medical/surgical history, family history, social history, and examination findings as documented by others; and I examined the patient myself. I personally reviewed the relevant tests, images, and reports as documented above. I formulated and edited as necessary the assessment and plan and discussed the findings and management plan with the patient and family.     J Luis Wheeler MD, PhD      Addendum 08/16/24   # OHTN OD  - Tmax 28/22  - IOP 28/22  - Previously on latanoprost - unclear who started it - off drops since 11/2023 when accidentally threw away.   - Optic atrophy workup in the past with Dr. Foster negative  - Restart  latanoprost QHS OU  - Glaucoma eval in 4-6 weeks    Neo Rivera MD  Vitreoretinal Surgery Fellow

## 2024-08-15 ENCOUNTER — TELEPHONE (OUTPATIENT)
Dept: OPHTHALMOLOGY | Facility: CLINIC | Age: 84
End: 2024-08-15
Payer: COMMERCIAL

## 2024-08-15 NOTE — TELEPHONE ENCOUNTER
[facility] Call Center    Phone Message    May a detailed message be left on voicemail: yes     Reason for Call: per pt she gave incorrect name of OTC meds at yesterday appt what pt is taking Generic for Zyrtech, Citrazine  pt also advised prior appts an RX for eye drops was given but this time Dr Wheeler did not give any RX for eye drops Please contact pt to discuss concerns Thank you     Action Taken: Message routed to:  [facility] ([facility] eye    Travel Screening: Not Applicable     Date of Service:

## 2024-08-16 RX ORDER — LATANOPROST 50 UG/ML
1 SOLUTION/ DROPS OPHTHALMIC DAILY
Qty: 7.5 ML | Refills: 5 | Status: SHIPPED | OUTPATIENT
Start: 2024-08-16 | End: 2024-10-01

## 2024-08-16 NOTE — TELEPHONE ENCOUNTER
Tentatively scheduled patient for a New Adult Glaucoma visit with Dr. Browning on 9/26/2024. Will attempt to call patient back to review appointment.     Mayelin Stephens RN 1:42 PM 08/16/24

## 2024-08-16 NOTE — TELEPHONE ENCOUNTER
Spoke to pt at 1415    Reviewed Rx for Latanoprost sent after review with Dr. Rivera and recommended follow up with glaucoma provider in about 6 weeks.    Scheduled October 1st with Dr. Browning and pt aware of date/time/location at [facility].    Bradley Allen RN 2:31 PM 08/16/24

## 2024-08-16 NOTE — TELEPHONE ENCOUNTER
Per retina fellow Dr Rivera: Placed order for latanoprost. Please call and schedule for glaucoma clinic visit in 4-6 weeks.     -------------------------------    Attempted to reach patient x2. Left message with direct number to review prescription and glaucoma scheduling options.     Mayelin Stephens RN 1:16 PM 08/16/24

## 2024-08-16 NOTE — TELEPHONE ENCOUNTER
Returned call to patient regarding eye drops. Patient reports she is wondering from Dr. Wheeler if she needs to still be on Latanoprost and if so, she needs a new prescription. Last prescribed in 2022.     Patient reports that she has not taken since November 2023 as the bottle got thrown away.     If Rx is sent, pharmacy requested: [id number] - [place], [place] - [address]     Dr. Wheeler will be contacted for recommendations.     Mayelin Stephens RN 8:31 AM 08/16/24

## 2024-08-23 ASSESSMENT — CUP TO DISC RATIO
OS_RATIO: 0.8
OD_RATIO: 0.8

## 2024-08-23 ASSESSMENT — EXTERNAL EXAM - RIGHT EYE: OD_EXAM: NORMAL

## 2024-08-23 ASSESSMENT — SLIT LAMP EXAM - LIDS
COMMENTS: NORMAL
COMMENTS: NORMAL

## 2024-08-23 ASSESSMENT — EXTERNAL EXAM - LEFT EYE: OS_EXAM: NORMAL

## 2024-09-30 DIAGNOSIS — H40.051 BORDERLINE GLAUCOMA OF RIGHT EYE WITH OCULAR HYPERTENSION: Primary | ICD-10-CM

## 2024-10-01 ENCOUNTER — OFFICE VISIT (OUTPATIENT)
Dept: OPHTHALMOLOGY | Facility: CLINIC | Age: 84
End: 2024-10-01
Attending: OPHTHALMOLOGY
Payer: COMMERCIAL

## 2024-10-01 DIAGNOSIS — H40.1134 PRIMARY OPEN ANGLE GLAUCOMA (POAG) OF BOTH EYES, INDETERMINATE STAGE: ICD-10-CM

## 2024-10-01 PROCEDURE — 99214 OFFICE O/P EST MOD 30 MIN: CPT | Mod: GC | Performed by: OPHTHALMOLOGY

## 2024-10-01 PROCEDURE — 92133 CPTRZD OPH DX IMG PST SGM ON: CPT | Performed by: OPHTHALMOLOGY

## 2024-10-01 PROCEDURE — G0463 HOSPITAL OUTPT CLINIC VISIT: HCPCS | Performed by: OPHTHALMOLOGY

## 2024-10-01 PROCEDURE — 76514 ECHO EXAM OF EYE THICKNESS: CPT | Performed by: OPHTHALMOLOGY

## 2024-10-01 PROCEDURE — 92083 EXTENDED VISUAL FIELD XM: CPT | Performed by: OPHTHALMOLOGY

## 2024-10-01 RX ORDER — TIMOLOL MALEATE 5 MG/ML
1 SOLUTION/ DROPS OPHTHALMIC DAILY
Qty: 10 ML | Refills: 11 | Status: SHIPPED | OUTPATIENT
Start: 2024-10-01

## 2024-10-01 ASSESSMENT — CONF VISUAL FIELD
OD_INFERIOR_NASAL_RESTRICTION: 0
OD_SUPERIOR_TEMPORAL_RESTRICTION: 0
OD_NORMAL: 1
METHOD: COUNTING FINGERS
OS_INFERIOR_NASAL_RESTRICTION: 3
OD_INFERIOR_TEMPORAL_RESTRICTION: 0
OD_SUPERIOR_NASAL_RESTRICTION: 0

## 2024-10-01 ASSESSMENT — VISUAL ACUITY
OS_CC: 3' 200 E
OD_CC: 20/80
METHOD: SNELLEN - LINEAR

## 2024-10-01 ASSESSMENT — EXTERNAL EXAM - LEFT EYE: OS_EXAM: NORMAL

## 2024-10-01 ASSESSMENT — TONOMETRY
OD_IOP_MMHG: 20
IOP_METHOD: TONOPEN
OD_IOP_MMHG: 19
OS_IOP_MMHG: 17
OS_IOP_MMHG: 18
IOP_METHOD: APPLANATION

## 2024-10-01 ASSESSMENT — CUP TO DISC RATIO
OD_RATIO: 0.8
OS_RATIO: 0.8

## 2024-10-01 ASSESSMENT — REFRACTION_WEARINGRX
OS_CYLINDER: +1.00
OS_ADD: +2.75
OS_SPHERE: -0.50
OS_AXIS: 100
OD_SPHERE: PLANO
OD_CYLINDER: +0.50
OD_AXIS: 075
OD_ADD: +2.75

## 2024-10-01 ASSESSMENT — PACHYMETRY
OS_CT(UM): 561
OD_CT(UM): 547

## 2024-10-01 ASSESSMENT — SLIT LAMP EXAM - LIDS
COMMENTS: NORMAL
COMMENTS: NORMAL

## 2024-10-01 ASSESSMENT — EXTERNAL EXAM - RIGHT EYE: OD_EXAM: NORMAL

## 2024-10-01 ASSESSMENT — GONIOSCOPY
OS_INFERIOR: CB
OS_TEMPORAL: CB
OS_SUPERIOR: CB
OD_INFERIOR: CB
METHOD: ZEISS, FOUR MIRROR
OD_NASAL: CB
OD_SUPERIOR: CB
OD_TEMPORAL: CB
OS_NASAL: CB

## 2024-10-01 NOTE — NURSING NOTE
Chief Complaints and History of Present Illnesses   Patient presents with    Glaucoma Evaluation     Chief Complaint(s) and History of Present Illness(es)       Glaucoma Evaluation              Laterality: both eyes    Associated symptoms: photophobia.  Negative for eye pain, tearing, flashes, floaters, itching and burning    Pain scale: 0/10              Comments    Sherita is here referred by Dr Wheeler for evaluation of glaucoma. She states no vision change since last seeing Dr Wheeler.     Gutierrez Santoyo COT 12:24 PM October 1, 2024

## 2024-10-01 NOTE — PATIENT INSTRUCTIONS
Start Timolol 1 drop daily in the morning in both eyes     Continue Latanoprost (green top)- apply once drop at bedtime to both eyes
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
## 2024-11-14 ENCOUNTER — OFFICE VISIT (OUTPATIENT)
Dept: OPHTHALMOLOGY | Facility: CLINIC | Age: 84
End: 2024-11-14
Payer: COMMERCIAL

## 2024-11-14 DIAGNOSIS — H35.373 EPIRETINAL MEMBRANE (ERM) OF BOTH EYES: ICD-10-CM

## 2024-11-14 DIAGNOSIS — H35.713 CENTRAL SEROUS CHORIORETINOPATHY OF BOTH EYES: ICD-10-CM

## 2024-11-14 DIAGNOSIS — H47.20 PARTIAL OPTIC ATROPHY: ICD-10-CM

## 2024-11-14 DIAGNOSIS — H40.1134 PRIMARY OPEN ANGLE GLAUCOMA (POAG) OF BOTH EYES, INDETERMINATE STAGE: Primary | ICD-10-CM

## 2024-11-14 PROCEDURE — G0463 HOSPITAL OUTPT CLINIC VISIT: HCPCS

## 2024-11-14 RX ORDER — LATANOPROST 50 UG/ML
1 SOLUTION/ DROPS OPHTHALMIC AT BEDTIME
Qty: 7.5 ML | Refills: 11 | Status: SHIPPED | OUTPATIENT
Start: 2024-11-14

## 2024-11-14 ASSESSMENT — TONOMETRY
OS_IOP_MMHG: 14
OD_IOP_MMHG: 13
IOP_METHOD: APPLANATION
IOP_METHOD: TONOPEN
OS_IOP_MMHG: 12
OD_IOP_MMHG: 13

## 2024-11-14 ASSESSMENT — VISUAL ACUITY
OS_CC: 1/200E
OD_CC+: -1
CORRECTION_TYPE: GLASSES
METHOD: SNELLEN - LINEAR
OD_CC: 20/100

## 2024-11-14 ASSESSMENT — EXTERNAL EXAM - RIGHT EYE: OD_EXAM: NORMAL

## 2024-11-14 ASSESSMENT — SLIT LAMP EXAM - LIDS
COMMENTS: NORMAL
COMMENTS: NORMAL

## 2024-11-14 ASSESSMENT — CUP TO DISC RATIO
OD_RATIO: 0.8
OS_RATIO: 0.8

## 2024-11-14 ASSESSMENT — REFRACTION_WEARINGRX
OD_CYLINDER: +0.50
OD_SPHERE: PLANO
OS_ADD: +2.75
OS_SPHERE: -0.50
OS_AXIS: 100
OD_AXIS: 075
OS_CYLINDER: +1.00
OD_ADD: +2.75

## 2024-11-14 ASSESSMENT — EXTERNAL EXAM - LEFT EYE: OS_EXAM: NORMAL
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
## 2024-11-14 NOTE — PATIENT INSTRUCTIONS
Continue Timolol 1 drop daily in the morning in both eyes     Continue Latanoprost (green top)- apply once drop at bedtime to both eyes

## 2024-12-12 ENCOUNTER — OFFICE VISIT (OUTPATIENT)
Dept: OPTOMETRY | Facility: CLINIC | Age: 84
End: 2024-12-12
Payer: COMMERCIAL

## 2024-12-12 DIAGNOSIS — H40.1134 PRIMARY OPEN ANGLE GLAUCOMA OF BOTH EYES, INDETERMINATE STAGE: Primary | ICD-10-CM

## 2024-12-12 DIAGNOSIS — H52.4 PRESBYOPIA: ICD-10-CM

## 2024-12-12 DIAGNOSIS — H47.20: ICD-10-CM

## 2024-12-12 PROBLEM — I89.0 LYMPHEDEMA OF BREAST: Status: ACTIVE | Noted: 2017-05-01

## 2024-12-12 PROBLEM — R60.1 GENERALIZED EDEMA: Status: ACTIVE | Noted: 2017-05-19

## 2024-12-12 PROBLEM — S04.012A: Status: ACTIVE | Noted: 2022-07-08

## 2024-12-12 PROBLEM — M25.519 SHOULDER PAIN: Status: ACTIVE | Noted: 2017-10-13

## 2024-12-12 PROBLEM — F33.9 DEPRESSION, RECURRENT (H): Status: ACTIVE | Noted: 2023-11-28

## 2024-12-12 PROBLEM — G62.9 PERIPHERAL NEUROPATHY: Status: ACTIVE | Noted: 2024-12-12

## 2024-12-12 RX ORDER — ESCITALOPRAM OXALATE 5 MG/1
5 TABLET ORAL EVERY MORNING
COMMUNITY
Start: 2024-01-27

## 2024-12-12 ASSESSMENT — TONOMETRY
OS_IOP_MMHG: 16
OD_IOP_MMHG: 16
IOP_METHOD: ICARE

## 2024-12-12 ASSESSMENT — REFRACTION_WEARINGRX
OD_SPHERE: +0.25
OS_AXIS: 095
OS_CYLINDER: +1.25
OD_CYLINDER: +0.50
OS_ADD: +3.25
OD_ADD: +3.25
OD_AXIS: 065
OS_SPHERE: -1.50

## 2024-12-12 ASSESSMENT — VISUAL ACUITY
OD_CC: 20/70
METHOD: SNELLEN - LINEAR
OD_CC+: -2
CORRECTION_TYPE: GLASSES

## 2024-12-12 ASSESSMENT — CUP TO DISC RATIO
OD_RATIO: 0.8
OS_RATIO: 0.8

## 2024-12-12 ASSESSMENT — SLIT LAMP EXAM - LIDS
COMMENTS: NORMAL
COMMENTS: NORMAL

## 2024-12-12 ASSESSMENT — REFRACTION_MANIFEST
OD_ADD: +3.50
OS_ADD: +3.50
OS_SPHERE: +0.50
OD_CYLINDER: SPHERE
OD_SPHERE: +0.75
OS_CYLINDER: SPHERE

## 2024-12-12 ASSESSMENT — EXTERNAL EXAM - LEFT EYE: OS_EXAM: NORMAL

## 2024-12-12 ASSESSMENT — EXTERNAL EXAM - RIGHT EYE: OD_EXAM: NORMAL

## 2024-12-12 NOTE — PROGRESS NOTES
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   Plan: Discussed findings with patient in detail. Advised patient that vision loss due to optic atrophy is permanent and possibly progressive. Continued use of good lighting was recommended for best vision. Patient demonstrated a good improvement in visual acuities today with an updated refraction. Suspect that visual concerns are related to a general reduction in acuity potential as well as glare sensitivity. Continued use of an anti-reflective coating was encouraged to mitigate any glare caused by the lens material. Using a yellow or isaiah fitover while outdoors or in very brightly lit conditions may be helpful, but full time use does not appear to be warranted. Patient should call or return to clinic if changes to vision are noted.     (H40.1134) Primary open angle glaucoma of both eyes, indeterminate stage    Comment: Likely contributing to reduction in vision.   Plan: Continue care with Holden and the rest of her eye care team.     (H52.4) Presbyopia  Plan: REFRACTION [[id number]]        Discussed findings with patient. New spectacle prescription dispensed to patient. Patient is welcome to return to clinic with prolonged adaptation difficulties. Patient should bring her new Rx to the [facility] in [place], as proximity to her home was very important when making a decision about where to get her new glasses.           60 minutes were spent on the date of the encounter doing chart review, history and exam, documentation, and further activities as noted above.    Complete documentation of historical and exam elements from today's encounter can  be found in the full encounter summary report (not reduplicated in this progress  note). I personally obtained the chief complaint(s) and history of present illness. I  confirmed and edited as necessary the review of  systems, past medical/surgical  history, family history, social history, and examination findings as documented by  others; and I examined the patient myself. I personally reviewed the relevant tests,  images, and reports as documented above. I formulated and edited as necessary the  assessment and plan and discussed the findings and management plan with the  patient and family.    Johnathon Esquivel OD

## 2024-12-12 NOTE — NURSING NOTE
"Chief Complaints and History of Present Illnesses   Patient presents with    Low Vision     Pt here for a low vision evaluation, referred by Dr Hatch.     Chief Complaint(s) and History of Present Illness(es)       Low Vision              Comments: Pt here for a low vision evaluation, referred by Dr Hatch.              Comments    Vision is unchanged since last exam. Uses wrap around tinted lenses. Pt notes issues with glasses- vision is not clear. She also cut her finger on lined bifocal and she was told to take off the Anti-reflective coating and her glasses look to be \"peeling\". Pt struggles with night driving and driving when there is snow on the ground. She also feels like she sees better without her glasses.   Pt using:  Timolol BID each eye     JESU Horne on 12/12/2024 at 9:41 AM                         "

## 2025-03-08 ENCOUNTER — HEALTH MAINTENANCE LETTER (OUTPATIENT)
Age: 85
End: 2025-03-08

## 2025-03-27 ENCOUNTER — OFFICE VISIT (OUTPATIENT)
Dept: OPTOMETRY | Facility: CLINIC | Age: 85
End: 2025-03-27
Payer: COMMERCIAL

## 2025-03-27 DIAGNOSIS — H47.20: ICD-10-CM

## 2025-03-27 DIAGNOSIS — H52.4 PRESBYOPIA: ICD-10-CM

## 2025-03-27 DIAGNOSIS — H40.1134 PRIMARY OPEN ANGLE GLAUCOMA OF BOTH EYES, INDETERMINATE STAGE: Primary | ICD-10-CM

## 2025-03-27 PROBLEM — R19.7 DIARRHEA: Status: ACTIVE | Noted: 2018-08-14

## 2025-03-27 PROBLEM — E73.9 LACTOSE INTOLERANCE: Status: ACTIVE | Noted: 2025-03-27

## 2025-03-27 PROBLEM — R69 DISEASE: Status: ACTIVE | Noted: 2025-03-27

## 2025-03-27 PROBLEM — R14.0 ABDOMINAL BLOATING: Status: ACTIVE | Noted: 2025-03-27

## 2025-03-27 PROBLEM — R14.0 ABDOMINAL DISTENSION, GASEOUS: Status: ACTIVE | Noted: 2021-12-01

## 2025-03-27 ASSESSMENT — VISUAL ACUITY
OD_CC+: -1
METHOD: SNELLEN - LINEAR
OD_CC: 20/80
CORRECTION_TYPE: GLASSES

## 2025-03-27 ASSESSMENT — REFRACTION_WEARINGRX
SPECS_TYPE: BIFOCAL
OS_ADD: +3.50
OD_SPHERE: +0.75
OS_SPHERE: +0.50
OS_CYLINDER: SPHERE
OD_CYLINDER: SPHERE
OD_ADD: +3.50

## 2025-03-27 ASSESSMENT — REFRACTION_MANIFEST
OD_CYLINDER: SPHERE
OD_SPHERE: +0.75
OS_CYLINDER: SPHERE
OS_SPHERE: +0.50

## 2025-03-27 ASSESSMENT — SLIT LAMP EXAM - LIDS
COMMENTS: NORMAL
COMMENTS: NORMAL

## 2025-03-27 ASSESSMENT — TONOMETRY
IOP_METHOD: ICARE
OD_IOP_MMHG: 17
OS_IOP_MMHG: 15

## 2025-03-27 ASSESSMENT — EXTERNAL EXAM - RIGHT EYE: OD_EXAM: NORMAL

## 2025-03-27 ASSESSMENT — CUP TO DISC RATIO
OS_RATIO: 0.8
OD_RATIO: 0.8

## 2025-03-27 ASSESSMENT — EXTERNAL EXAM - LEFT EYE: OS_EXAM: NORMAL

## 2025-03-27 NOTE — NURSING NOTE
Chief Complaints and History of Present Illnesses   Patient presents with    Low Vision     Pt here for a low vision evaluation.      Chief Complaint(s) and History of Present Illness(es)       Low Vision              Comments: Pt here for a low vision evaluation.               Comments    Pt has largely unchanged vision since last exam. But notes improved vision with new glasses. She uses a magnifier. She states depth perception is still hard- but better since new glasses. She also uses yellow and orange tint to help with lighting issues.      JESU Horne on 3/27/2025 at 9:14 AM

## 2025-03-27 NOTE — PROGRESS NOTES
Assessment/Plan  (H40.6064) Primary open angle glaucoma of both eyes, indeterminate stage  (primary encounter diagnosis)  Comment: Follows with Dr. Hatch  Plan: Low Vision (OT) Referral       Discussed findings with patient in detail. Continue care with glaucoma provider. Continue following treatment recommendations of glaucoma provider. Return to clinic with vision changes.     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   Plan: Discussed findings. Discontinuity of patient's field of vision is most likely reason for reading difficulties, as single letter/word acuity is much better than patient is performing while reading. Adjustments to screen magnification and brightness will likely be helpful for patient, so will recommend a vision rehab OT referral to discuss skills or tools to enhance reading.     (H52.4) Presbyopia  Plan: Continue with current Rx. Consider increasing near add a little down the road. Advised patient that while a bump to near add would improve acuity a little, it would also shorten her focal distance to a point that would likely be a little more uncomfortable. Recommend meeting with OT first before making those adjustments.           More than 40 minutes were spent on the date of the encounter doing chart review, history and exam, documentation, and further activities as noted above.    Complete documentation of historical and exam elements from today's encounter can  be found in the full encounter summary report (not reduplicated in this progress  note). I personally obtained the chief complaint(s) and history of present illness. I  confirmed and edited as necessary the review of systems, past medical/surgical  history, family history, social history, and examination findings as documented by  others; and I examined the patient myself. I personally reviewed the relevant tests,  images, and reports as documented above. I formulated and edited as  necessary the  assessment and plan and discussed the findings and management plan with the  patient and family.    Johnathon Esquivel OD

## 2025-08-12 DIAGNOSIS — H35.713 CENTRAL SEROUS CHORIORETINOPATHY OF BOTH EYES: Primary | ICD-10-CM

## 2025-08-19 ENCOUNTER — OFFICE VISIT (OUTPATIENT)
Dept: OPHTHALMOLOGY | Facility: CLINIC | Age: 85
End: 2025-08-19
Attending: OPHTHALMOLOGY
Payer: COMMERCIAL

## 2025-08-19 DIAGNOSIS — H35.373 EPIRETINAL MEMBRANE (ERM) OF BOTH EYES: ICD-10-CM

## 2025-08-19 DIAGNOSIS — H35.30 AMD (AGE-RELATED MACULAR DEGENERATION), BILATERAL: ICD-10-CM

## 2025-08-19 DIAGNOSIS — H40.1134 PRIMARY OPEN ANGLE GLAUCOMA (POAG) OF BOTH EYES, INDETERMINATE STAGE: ICD-10-CM

## 2025-08-19 DIAGNOSIS — H47.20 PARTIAL OPTIC ATROPHY: ICD-10-CM

## 2025-08-19 DIAGNOSIS — H35.54 MACULAR PATTERN DYSTROPHY: Primary | ICD-10-CM

## 2025-08-19 DIAGNOSIS — H35.713 CENTRAL SEROUS CHORIORETINOPATHY OF BOTH EYES: ICD-10-CM

## 2025-08-19 PROCEDURE — 92134 CPTRZ OPH DX IMG PST SGM RTA: CPT | Performed by: OPHTHALMOLOGY

## 2025-08-19 PROCEDURE — 99207 FUNDUS AUTOFLUORESCENCE IMAGE (FAF) OU (BOTH EYES): CPT | Performed by: OPHTHALMOLOGY

## 2025-08-19 PROCEDURE — 92134 CPTRZ OPH DX IMG PST SGM RTA: CPT | Mod: 26 | Performed by: OPHTHALMOLOGY

## 2025-08-19 PROCEDURE — 92014 COMPRE OPH EXAM EST PT 1/>: CPT | Mod: GC | Performed by: OPHTHALMOLOGY

## 2025-08-19 PROCEDURE — 92250 FUNDUS PHOTOGRAPHY W/I&R: CPT | Performed by: OPHTHALMOLOGY

## 2025-08-19 PROCEDURE — 92081 LIMITED VISUAL FIELD XM: CPT | Mod: 26 | Performed by: OPHTHALMOLOGY

## 2025-08-19 PROCEDURE — G0463 HOSPITAL OUTPT CLINIC VISIT: HCPCS | Performed by: OPHTHALMOLOGY

## 2025-08-19 PROCEDURE — 92081 LIMITED VISUAL FIELD XM: CPT | Performed by: OPHTHALMOLOGY

## 2025-08-19 ASSESSMENT — TONOMETRY
IOP_METHOD: ICARE
OS_IOP_MMHG: 14
OD_IOP_MMHG: 16

## 2025-08-19 ASSESSMENT — VISUAL ACUITY
OD_CC+: -1
CORRECTION_TYPE: GLASSES
OD_CC: 20/100
METHOD: SNELLEN - LINEAR

## 2025-08-19 ASSESSMENT — REFRACTION_WEARINGRX
SPECS_TYPE: BIFOCAL
OD_ADD: +4.00
OS_CYLINDER: SPHERE
OD_SPHERE: +0.75
OS_ADD: +4.00
OS_SPHERE: +0.50
OD_CYLINDER: SPHERE

## 2025-08-19 ASSESSMENT — EXTERNAL EXAM - LEFT EYE: OS_EXAM: NORMAL

## 2025-08-19 ASSESSMENT — CONF VISUAL FIELD
OD_SUPERIOR_NASAL_RESTRICTION: 3
OS_SUPERIOR_NASAL_RESTRICTION: 0
OS_INFERIOR_NASAL_RESTRICTION: 3
METHOD: COUNTING FINGERS

## 2025-08-19 ASSESSMENT — CUP TO DISC RATIO
OS_RATIO: 0.8
OD_RATIO: 0.8

## 2025-08-19 ASSESSMENT — SLIT LAMP EXAM - LIDS
COMMENTS: NORMAL
COMMENTS: NORMAL

## 2025-08-19 ASSESSMENT — EXTERNAL EXAM - RIGHT EYE: OD_EXAM: NORMAL

## 2025-08-20 ENCOUNTER — PATIENT OUTREACH (OUTPATIENT)
Dept: CARE COORDINATION | Facility: CLINIC | Age: 85
End: 2025-08-20
Payer: COMMERCIAL